# Patient Record
Sex: MALE | Race: BLACK OR AFRICAN AMERICAN | Employment: FULL TIME | URBAN - METROPOLITAN AREA
[De-identification: names, ages, dates, MRNs, and addresses within clinical notes are randomized per-mention and may not be internally consistent; named-entity substitution may affect disease eponyms.]

---

## 2017-10-04 ENCOUNTER — ED HISTORICAL/CONVERTED ENCOUNTER (OUTPATIENT)
Dept: OTHER | Age: 22
End: 2017-10-04

## 2017-10-09 ENCOUNTER — TELEPHONE (OUTPATIENT)
Dept: CARDIOLOGY CLINIC | Age: 22
End: 2017-10-09

## 2017-10-09 NOTE — TELEPHONE ENCOUNTER
Left message on voicemail asking for call back with hospital we can request records from and/or if he can bring any records. I also reminded him to arrive 20 minutes early.     Future Appointments  Date Time Provider Erin Dyer   10/10/2017 10:20 AM Rui Santiago  [address]

## 2017-10-10 ENCOUNTER — OFFICE VISIT (OUTPATIENT)
Dept: CARDIOLOGY CLINIC | Age: 22
End: 2017-10-10

## 2017-10-10 VITALS — DIASTOLIC BLOOD PRESSURE: 94 MMHG | HEART RATE: 75 BPM | WEIGHT: 220 LBS | SYSTOLIC BLOOD PRESSURE: 156 MMHG

## 2017-10-10 DIAGNOSIS — R07.9 CHEST PAIN, UNSPECIFIED TYPE: Primary | ICD-10-CM

## 2017-10-10 DIAGNOSIS — R94.31 ABNORMAL EKG: ICD-10-CM

## 2017-10-10 DIAGNOSIS — I10 ESSENTIAL HYPERTENSION: ICD-10-CM

## 2017-10-10 RX ORDER — INSULIN ASPART 100 [IU]/ML
INJECTION, SOLUTION INTRAVENOUS; SUBCUTANEOUS
COMMUNITY

## 2017-10-10 RX ORDER — INSULIN GLARGINE 100 [IU]/ML
30 INJECTION, SOLUTION SUBCUTANEOUS
COMMUNITY

## 2017-10-10 RX ORDER — LISINOPRIL 10 MG/1
10 TABLET ORAL DAILY
Qty: 30 TAB | Refills: 30 | Status: SHIPPED | OUTPATIENT
Start: 2017-10-10 | End: 2017-10-31 | Stop reason: SDUPTHER

## 2017-10-10 NOTE — PROGRESS NOTES
HISTORY OF PRESENT ILLNESS  Keisha Li is a 23 y.o. male. He is referred for evaluation of chest pain. He has had the pain for about one month and he has gone to three separate emergency rooms. He was given nonsteroidal medications to take, but has only recently been taking them. He characterizes the discomfort as a squeezing discomfort and shortness of breath. It is worse with movement and taking a deep breath. He has type 1 diabetes starting six years ago. One year ago, when he had drainage of an abscess, he had high blood pressure and was given Lisinopril, but never took it. EKGs have shown left ventricular hypertrophy with strain pattern and this is evident on the EKG done in the office today. He does not smoke cigarettes or drink alcohol. He was running one mile three to four times per week where he lives in [place], but he cut back on the advice of physicians about one month ago. His uncle had a heart attack. HPI  Patient Active Problem List   Diagnosis Code    Chest pain R07.9    Essential hypertension I10    Abnormal EKG R94.31     Current Outpatient Prescriptions   Medication Sig Dispense Refill    lisinopril (PRINIVIL, ZESTRIL) 10 mg tablet Take 1 Tab by mouth daily. 30 Tab 30    insulin aspart (NOVOLOG) 100 unit/mL injection by SubCUTAneous route Before breakfast, lunch, and dinner.  insulin glargine (LANTUS) 100 unit/mL injection 30 Units by SubCUTAneous route nightly. Past Medical History:   Diagnosis Date    Diabetes ([facility] [address]     Essential hypertension        Review of Systems   Cardiovascular: Positive for chest pain. All other systems reviewed and are negative. Visit Vitals    BP (!) 156/94 (BP 1 Location: Left arm, BP Patient Position: Sitting)    Pulse 75    Wt 220 lb (99.8 kg)       Physical Exam   Constitutional: He is oriented to person, place, and time. He appears well-nourished. HENT:   Head: Atraumatic.    Eyes: Conjunctivae are normal.   Neck: Neck supple. Cardiovascular: Normal rate, regular rhythm and normal heart sounds. Exam reveals no gallop and no friction rub. No murmur heard. Pulmonary/Chest: Breath sounds normal. He has no wheezes. He exhibits tenderness. Abdominal: Bowel sounds are normal.   Musculoskeletal: He exhibits no edema. Neurological: He is oriented to person, place, and time. Skin: Skin is dry. Nursing note and vitals reviewed. ASSESSMENT and PLAN  He has definite chest wall tenderness over his left anterior chest, which reproduces his symptoms. His EKG, however, shows left ventricular hypertrophy with strain and he does have hypertension in the office today. Given his diabetes, I will start him on Lisinopril 10 mg per day. I will see him back in about three weeks and do an echo here in the office. He may have had one done six weeks ago in [facility] and was told there were no abnormalities. He also had labs done and he was told there were no abnormalities. I am not sure he needs stress testing at this point, but possibly in the future.

## 2017-10-10 NOTE — MR AVS SNAPSHOT
Visit Information Date & Time Provider Department Dept. Phone Encounter #  
 10/10/2017 10:20 AM Sj Addison MD [facility] Anayeli Ozuna [phone] [id number] Allergies as of 10/10/2017  Review Complete On: 10/10/2017 By: Beth Reading No Known Allergies Current Immunizations  Never Reviewed No immunizations on file. Not reviewed this visit You Were Diagnosed With   
  
 Codes Comments Chest pain, unspecified type    -  Primary ICD-10-CM: R07.9 ICD-9-CM: 786.50 Vitals BP Pulse Weight(growth percentile) Smoking Status (!) 156/94 (BP 1 Location: Left arm, BP Patient Position: Sitting) 75 220 lb (99.8 kg) Never Smoker Vitals History Your Updated Medication List  
  
   
This list is accurate as of: 10/10/17 10:51 AM.  Always use your most recent med list.  
  
  
  
  
 LANTUS 100 unit/mL injection Generic drug:  insulin glargine 30 Units by SubCUTAneous route nightly. NovoLOG 100 unit/mL injection Generic drug:  insulin aspart  
by SubCUTAneous route Before breakfast, lunch, and dinner. We Performed the Following AMB POC EKG ROUTINE W/ 12 LEADS, INTER & REP [62473 CPT(R)] Introducing [facility]! Kelsey Wright introduces [employer] patient portal. Now you can access parts of your medical record, email your doctor's office, and request medication refills online. 1. In your internet browser, go to [email or web address] 2. Click on the First Time User? Click Here link in the Sign In box. You will see the New Member Sign Up page. 3. Enter your [employer] Access Code exactly as it appears below. You will not need to use this code after youve completed the sign-up process. If you do not sign up before the expiration date, you must request a new code. · [employer] Access Code: [email or web address] Expires: 1/8/2018 10:51 AM 
 
 4. Enter the last four digits of your Social Security Number (xxxx) and Date of Birth (mm/dd/yyyy) as indicated and click Submit. You will be taken to the next sign-up page. 5. Create a [employer] ID. This will be your [employer] login ID and cannot be changed, so think of one that is secure and easy to remember. 6. Create a [employer] password. You can change your password at any time. 7. Enter your Password Reset Question and Answer. This can be used at a later time if you forget your password. 8. Enter your e-mail address. You will receive e-mail notification when new information is available in [address] 9. Click Sign Up. You can now view and download portions of your medical record. 10. Click the Download Summary menu link to download a portable copy of your medical information. If you have questions, please visit the Frequently Asked Questions section of the [employer] website. Remember, [employer] is NOT to be used for urgent needs. For medical emergencies, dial 911. Now available from your iPhone and Android! Please provide this summary of care documentation to your next provider. If you have any questions after today's visit, please call [phone].

## 2017-10-31 ENCOUNTER — OFFICE VISIT (OUTPATIENT)
Dept: CARDIOLOGY CLINIC | Age: 22
End: 2017-10-31

## 2017-10-31 ENCOUNTER — CLINICAL SUPPORT (OUTPATIENT)
Dept: CARDIOLOGY CLINIC | Age: 22
End: 2017-10-31

## 2017-10-31 VITALS — SYSTOLIC BLOOD PRESSURE: 144 MMHG | DIASTOLIC BLOOD PRESSURE: 80 MMHG | HEART RATE: 70 BPM | WEIGHT: 218.2 LBS

## 2017-10-31 DIAGNOSIS — E10.9 TYPE 1 DIABETES MELLITUS WITHOUT COMPLICATION (HCC): ICD-10-CM

## 2017-10-31 DIAGNOSIS — R94.31 ABNORMAL EKG: Primary | ICD-10-CM

## 2017-10-31 DIAGNOSIS — R07.9 CHEST PAIN, UNSPECIFIED TYPE: ICD-10-CM

## 2017-10-31 DIAGNOSIS — I10 ESSENTIAL HYPERTENSION: ICD-10-CM

## 2017-10-31 DIAGNOSIS — R94.31 ABNORMAL EKG: ICD-10-CM

## 2017-10-31 DIAGNOSIS — I51.7 LVH (LEFT VENTRICULAR HYPERTROPHY): Primary | ICD-10-CM

## 2017-10-31 RX ORDER — LISINOPRIL 20 MG/1
20 TABLET ORAL DAILY
Qty: 30 TAB | Refills: 11 | Status: SHIPPED | OUTPATIENT
Start: 2017-10-31

## 2017-10-31 NOTE — PROGRESS NOTES
HISTORY OF PRESENT ILLNESS  Murphy Hoang is a 23 y.o. male. HPI  Patient Active Problem List   Diagnosis Code    Chest pain R07.9    Essential hypertension I10    Abnormal EKG R94.31     Current Outpatient Prescriptions   Medication Sig Dispense Refill    lisinopril (PRINIVIL, ZESTRIL) 20 mg tablet Take 1 Tab by mouth daily. 30 Tab 11    insulin aspart (NOVOLOG) 100 unit/mL injection by SubCUTAneous route Before breakfast, lunch, and dinner.  insulin glargine (LANTUS) 100 unit/mL injection 30 Units by SubCUTAneous route nightly. Past Medical History:   Diagnosis Date    Diabetes ([facility] [address]     Essential hypertension      History reviewed. No pertinent surgical history. He is seen in followup for chest pain. He has juvenile onset diabetes and mild hypertension. His chest pain has been noted to be atypical, but his EKG showed left ventricular hypertrophy with strain. An echo done in the office today showed normal left ventricular function but mild hypertrophy. His chest pain has been better since starting on Lisinopril 10 mg, but he still has occasional pain and had a bad episode yesterday. His mother says that there is knotting up of his chest wall when he has the pain - suggesting some spasm of muscles. His pain lasted sixty to ninety minutes yesterday yet his wall motion is normal on echo today. Review of Systems   Cardiovascular: Positive for chest pain. All other systems reviewed and are negative. Visit Vitals    /80 (BP 1 Location: Left arm, BP Patient Position: Sitting)    Pulse 70    Wt 218 lb 3.2 oz (99 kg)       Physical Exam   Constitutional: He is oriented to person, place, and time. He appears well-nourished. HENT:   Head: Atraumatic. Eyes: Conjunctivae are normal.   Neck: Neck supple. Cardiovascular: Normal rate, regular rhythm and normal heart sounds. Exam reveals no gallop and no friction rub. No murmur heard.   Pulmonary/Chest: Breath sounds normal. He has no wheezes. Abdominal: Bowel sounds are normal.   Musculoskeletal: He exhibits no edema. Neurological: He is oriented to person, place, and time. Skin: Skin is dry. Nursing note and vitals reviewed. ASSESSMENT and PLAN  His blood pressure is better, but is still slightly elevated. 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 However, I will increase his Lisinopril from 10 mg to 20 mg per day and see him back on an as-needed basis. His chest pain is clearly noncardiac. I have suggested that he return to exercise. He will call if his blood pressure seems too high or too low.

## 2017-10-31 NOTE — MR AVS SNAPSHOT
Visit Information Date & Time Provider Department Dept. Phone Encounter #  
 10/31/2017 10:40 AM Elizabeth Lanier MD [facility] Sarai Aguilar [phone] [id number] Upcoming Health Maintenance Date Due DTaP/Tdap/Td series (1 - Tdap) 2/25/2014 INFLUENZA AGE 9 TO ADULT 8/1/2017 Allergies as of 10/31/2017  Review Complete On: 10/31/2017 By: Gloria Mcare No Known Allergies Current Immunizations  Never Reviewed No immunizations on file. Not reviewed this visit Vitals BP Pulse Weight(growth percentile) Smoking Status 144/80 (BP 1 Location: Left arm, BP Patient Position: Sitting) 70 218 lb 3.2 oz (99 kg) Never Smoker Vitals History Preferred Pharmacy Pharmacy Name Phone [facility] [address] [facility] Your Updated Medication List  
  
   
This list is accurate as of: 10/31/17 10:57 AM.  Always use your most recent med list.  
  
  
  
  
 LANTUS 100 unit/mL injection Generic drug:  insulin glargine 30 Units by SubCUTAneous route nightly. lisinopril 10 mg tablet Commonly known as:  Mollie Ripa Take 1 Tab by mouth daily. NovoLOG 100 unit/mL injection Generic drug:  insulin aspart  
by SubCUTAneous route Before breakfast, lunch, and dinner. Introducing [facility]! Anahi Davalos introduces [employer] patient portal. Now you can access parts of your medical record, email your doctor's office, and request medication refills online. 1. In your internet browser, go to [email or web address] 2. Click on the First Time User? Click Here link in the Sign In box. You will see the New Member Sign Up page. 3. Enter your [employer] Access Code exactly as it appears below. You will not need to use this code after youve completed the sign-up process. If you do not sign up before the expiration date, you must request a new code. · [employer] Access Code: [email or web address] Expires: 1/8/2018 10:51 AM 
 
4. Enter the last four digits of your Social Security Number (xxxx) and Date of Birth (mm/dd/yyyy) as indicated and click Submit. You will be taken to the next sign-up page. 5. Create a [employer] ID. This will be your [employer] login ID and cannot be changed, so think of one that is secure and easy to remember. 6. Create a [employer] password. You can change your password at any time. 7. Enter your Password Reset Question and Answer. This can be used at a later time if you forget your password. 8. Enter your e-mail address. You will receive e-mail notification when new information is available in [address] 9. Click Sign Up. You can now view and download portions of your medical record. 10. Click the Download Summary menu link to download a portable copy of your medical information. If you have questions, please visit the Frequently Asked Questions section of the [employer] website. Remember, [employer] is NOT to be used for urgent needs. For medical emergencies, dial 911. Now available from your iPhone and Android! Please provide this summary of care documentation to your next provider. If you have any questions after today's visit, please call [phone].

## 2020-10-26 ENCOUNTER — HOSPITAL ENCOUNTER (EMERGENCY)
Age: 25
Discharge: HOME | End: 2020-10-26
Attending: EMERGENCY MEDICINE
Payer: COMMERCIAL

## 2020-10-26 ENCOUNTER — APPOINTMENT (OUTPATIENT)
Dept: CT IMAGING | Age: 25
End: 2020-10-26
Attending: EMERGENCY MEDICINE
Payer: COMMERCIAL

## 2020-10-26 ENCOUNTER — APPOINTMENT (OUTPATIENT)
Dept: GENERAL RADIOLOGY | Age: 25
End: 2020-10-26
Attending: EMERGENCY MEDICINE
Payer: COMMERCIAL

## 2020-10-26 VITALS
SYSTOLIC BLOOD PRESSURE: 116 MMHG | RESPIRATION RATE: 15 BRPM | TEMPERATURE: 98.3 F | HEIGHT: 72 IN | WEIGHT: 230 LBS | HEART RATE: 94 BPM | BODY MASS INDEX: 31.15 KG/M2 | DIASTOLIC BLOOD PRESSURE: 75 MMHG | OXYGEN SATURATION: 99 %

## 2020-10-26 DIAGNOSIS — R07.9 CHEST PAIN, UNSPECIFIED TYPE: Primary | ICD-10-CM

## 2020-10-26 LAB
ALBUMIN SERPL-MCNC: 4.2 G/DL (ref 3.5–5)
ALBUMIN/GLOB SERPL: 1.1 {RATIO} (ref 1.1–2.2)
ALP SERPL-CCNC: 121 U/L (ref 45–117)
ALT SERPL-CCNC: 32 U/L (ref 12–78)
ANION GAP SERPL CALC-SCNC: 6 MMOL/L (ref 5–15)
AST SERPL W P-5'-P-CCNC: 13 U/L (ref 15–37)
ATRIAL RATE: 115 BPM
BASOPHILS # BLD: 0 K/UL (ref 0–0.1)
BASOPHILS NFR BLD: 0 % (ref 0–1)
BILIRUB SERPL-MCNC: 0.2 MG/DL (ref 0.2–1)
BNP SERPL-MCNC: 6 PG/ML
BUN SERPL-MCNC: 11 MG/DL (ref 6–20)
BUN/CREAT SERPL: 12 (ref 12–20)
CA-I BLD-MCNC: 9.3 MG/DL (ref 8.5–10.1)
CALCULATED P AXIS, ECG09: 54 DEGREES
CALCULATED R AXIS, ECG10: 53 DEGREES
CALCULATED T AXIS, ECG11: -71 DEGREES
CHLORIDE SERPL-SCNC: 101 MMOL/L (ref 97–108)
CO2 SERPL-SCNC: 27 MMOL/L (ref 21–32)
CREAT SERPL-MCNC: 0.95 MG/DL (ref 0.7–1.3)
DIAGNOSIS, 93000: NORMAL
DIFFERENTIAL METHOD BLD: ABNORMAL
EOSINOPHIL # BLD: 0 K/UL (ref 0–0.4)
EOSINOPHIL NFR BLD: 0 % (ref 0–7)
ERYTHROCYTE [DISTWIDTH] IN BLOOD BY AUTOMATED COUNT: 12.9 % (ref 11.5–14.5)
GLOBULIN SER CALC-MCNC: 4 G/DL (ref 2–4)
GLUCOSE SERPL-MCNC: 385 MG/DL (ref 65–100)
HCT VFR BLD AUTO: 42.1 % (ref 36.6–50.3)
HGB BLD-MCNC: 14.3 G/DL (ref 12.1–17)
IMM GRANULOCYTES # BLD AUTO: 0 K/UL (ref 0–0.04)
IMM GRANULOCYTES NFR BLD AUTO: 0 % (ref 0–0.5)
LYMPHOCYTES # BLD: 0.8 K/UL (ref 0.8–3.5)
LYMPHOCYTES NFR BLD: 9 % (ref 12–49)
MCH RBC QN AUTO: 28.3 PG (ref 26–34)
MCHC RBC AUTO-ENTMCNC: 34 G/DL (ref 30–36.5)
MCV RBC AUTO: 83.4 FL (ref 80–99)
MONOCYTES # BLD: 0.3 K/UL (ref 0–1)
MONOCYTES NFR BLD: 4 % (ref 5–13)
NEUTS SEG # BLD: 7.3 K/UL (ref 1.8–8)
NEUTS SEG NFR BLD: 87 % (ref 32–75)
P-R INTERVAL, ECG05: 132 MS
PLATELET # BLD AUTO: 257 K/UL (ref 150–400)
PMV BLD AUTO: 10.3 FL (ref 8.9–12.9)
POTASSIUM SERPL-SCNC: 4.4 MMOL/L (ref 3.5–5.1)
PROT SERPL-MCNC: 8.2 G/DL (ref 6.4–8.2)
Q-T INTERVAL, ECG07: 314 MS
QRS DURATION, ECG06: 78 MS
QTC CALCULATION (BEZET), ECG08: 434 MS
RBC # BLD AUTO: 5.05 M/UL (ref 4.1–5.7)
SODIUM SERPL-SCNC: 134 MMOL/L (ref 136–145)
TROPONIN I SERPL-MCNC: <0.05 NG/ML
TROPONIN I SERPL-MCNC: <0.05 NG/ML
VENTRICULAR RATE, ECG03: 115 BPM
WBC # BLD AUTO: 8.4 K/UL (ref 4.1–11.1)

## 2020-10-26 PROCEDURE — 71275 CT ANGIOGRAPHY CHEST: CPT

## 2020-10-26 PROCEDURE — 83880 ASSAY OF NATRIURETIC PEPTIDE: CPT

## 2020-10-26 PROCEDURE — 74011250637 HC RX REV CODE- 250/637: Performed by: EMERGENCY MEDICINE

## 2020-10-26 PROCEDURE — 80053 COMPREHEN METABOLIC PANEL: CPT

## 2020-10-26 PROCEDURE — 36415 COLL VENOUS BLD VENIPUNCTURE: CPT

## 2020-10-26 PROCEDURE — 74011000636 HC RX REV CODE- 636: Performed by: EMERGENCY MEDICINE

## 2020-10-26 PROCEDURE — 93005 ELECTROCARDIOGRAM TRACING: CPT

## 2020-10-26 PROCEDURE — 85025 COMPLETE CBC W/AUTO DIFF WBC: CPT

## 2020-10-26 PROCEDURE — 84484 ASSAY OF TROPONIN QUANT: CPT

## 2020-10-26 PROCEDURE — 74011250636 HC RX REV CODE- 250/636: Performed by: EMERGENCY MEDICINE

## 2020-10-26 PROCEDURE — 99284 EMERGENCY DEPT VISIT MOD MDM: CPT

## 2020-10-26 PROCEDURE — 96374 THER/PROPH/DIAG INJ IV PUSH: CPT

## 2020-10-26 PROCEDURE — 71045 X-RAY EXAM CHEST 1 VIEW: CPT

## 2020-10-26 RX ORDER — KETOROLAC TROMETHAMINE 10 MG/1
10 TABLET, FILM COATED ORAL
Qty: 20 TAB | Refills: 0 | Status: SHIPPED | OUTPATIENT
Start: 2020-10-26 | End: 2020-10-31

## 2020-10-26 RX ORDER — LORAZEPAM 1 MG/1
1 TABLET ORAL
Status: COMPLETED | OUTPATIENT
Start: 2020-10-26 | End: 2020-10-26

## 2020-10-26 RX ORDER — KETOROLAC TROMETHAMINE 15 MG/ML
15 INJECTION, SOLUTION INTRAMUSCULAR; INTRAVENOUS ONCE
Status: COMPLETED | OUTPATIENT
Start: 2020-10-26 | End: 2020-10-26

## 2020-10-26 RX ORDER — HYDROXYZINE PAMOATE 25 MG/1
25 CAPSULE ORAL
Qty: 20 CAP | Refills: 0 | Status: SHIPPED | OUTPATIENT
Start: 2020-10-26 | End: 2020-11-02

## 2020-10-26 RX ADMIN — LORAZEPAM 1 MG: 1 TABLET ORAL at 16:54

## 2020-10-26 RX ADMIN — IOPAMIDOL 100 ML: 755 INJECTION, SOLUTION INTRAVENOUS at 10:15

## 2020-10-26 RX ADMIN — KETOROLAC TROMETHAMINE 15 MG: 15 INJECTION, SOLUTION INTRAMUSCULAR; INTRAVENOUS at 19:17

## 2020-10-26 NOTE — ED PROVIDER NOTES
EMERGENCY DEPARTMENT HISTORY AND PHYSICAL EXAM        Date: 10/26/2020  Patient Name: Stef Lyle. History of Presenting Illness     Chief Complaint   Patient presents with    Chest Pain    Shortness of Breath       History Provided By: Patient    HPI: Stef Lyle., 30 y.o. male with past medical history of diabetes and hypertension who presents with chest pain. Symptoms started while he was typing at work around noon time. Pain is in the center of his chest and the left side. Pain is 9/10, pressure, and nonradiating. He has no aggravating or relieving factors. He did not take anything for this. PCP: None    Current Outpatient Medications   Medication Sig Dispense Refill    ketorolac (TORADOL) 10 mg tablet Take 1 Tab by mouth every six (6) hours as needed for Pain for up to 5 days. 20 Tab 0    hydrOXYzine pamoate (VISTARIL) 25 mg capsule Take 1 Cap by mouth three (3) times daily as needed for Anxiety for up to 7 days. 20 Cap 0    lisinopril (PRINIVIL, ZESTRIL) 20 mg tablet Take 1 Tab by mouth daily. 30 Tab 11    insulin aspart (NOVOLOG) 100 unit/mL injection by SubCUTAneous route Before breakfast, lunch, and dinner.  insulin glargine (LANTUS) 100 unit/mL injection 30 Units by SubCUTAneous route nightly. Past History     Past Medical History:  Past Medical History:   Diagnosis Date    Diabetes ([address]     Essential hypertension        Past Surgical History:  No past surgical history on file. Family History:  No family history on file. Social History:  Social History     Tobacco Use    Smoking status: Never Smoker    Smokeless tobacco: Never Used   Substance Use Topics    Alcohol use: Not on file    Drug use: Not on file       Allergies:  No Known Allergies    Review of Systems   Review of Systems   Constitutional: Negative for fever. HENT: Negative for congestion. Eyes: Negative for visual disturbance. Respiratory: Negative for shortness of breath. Cardiovascular: Positive for chest pain. Gastrointestinal: Negative for abdominal pain. Genitourinary: Negative for dysuria. Musculoskeletal: Negative for arthralgias. Skin: Negative for rash. Neurological: Negative for headaches. Physical Exam   General: No acute distress, uncomfortable and tearful. Well-nourished. Skin: No rash. Head: Normocephalic. Atraumatic. Eye: No proptosis or conjunctival injections. Respiratory: No apparent respiratory distress. Gastrointestinal: Nondistended. Musculoskeletal: No obvious bony deformities. Psychiatric: Cooperative. Appropriate mood and affect. Diagnostic Study Results     Labs -     Recent Results (from the past 24 hour(s))   EKG, 12 LEAD, INITIAL    Collection Time: 10/26/20  2:14 PM   Result Value Ref Range    Ventricular Rate 115 BPM    Atrial Rate 115 BPM    P-R Interval 132 ms    QRS Duration 78 ms    Q-T Interval 314 ms    QTC Calculation (Bezet) 434 ms    Calculated P Axis 54 degrees    Calculated R Axis 53 degrees    Calculated T Axis -71 degrees    Diagnosis       Sinus tachycardia  T wave abnormality, consider inferior ischemia  T wave abnormality, consider anterolateral ischemia  Abnormal ECG  No previous ECGs available  Confirmed by Lidia Grajeda ([id number]) on 10/26/2020 3:02:32 PM     CBC WITH AUTOMATED DIFF    Collection Time: 10/26/20  2:15 PM   Result Value Ref Range    WBC 8.4 4.1 - 11.1 K/uL    RBC 5.05 4. 10 - 5.70 M/uL    HGB 14.3 12.1 - 17.0 g/dL    HCT 42.1 36.6 - 50.3 %    MCV 83.4 80.0 - 99.0 FL    MCH 28.3 26.0 - 34.0 PG    MCHC 34.0 30.0 - 36.5 g/dL    RDW 12.9 11.5 - 14.5 %    PLATELET 352 473 - 070 K/uL    MPV 10.3 8.9 - 12.9 FL    NEUTROPHILS 87 (H) 32 - 75 %    LYMPHOCYTES 9 (L) 12 - 49 %    MONOCYTES 4 (L) 5 - 13 %    EOSINOPHILS 0 0 - 7 %    BASOPHILS 0 0 - 1 %    IMMATURE GRANULOCYTES 0 0.0 - 0.5 %    ABS. NEUTROPHILS 7.3 1.8 - 8.0 K/UL    ABS. LYMPHOCYTES 0.8 0.8 - 3.5 K/UL    ABS. MONOCYTES 0.3 0.0 - 1.0 K/UL    ABS. EOSINOPHILS 0.0 0.0 - 0.4 K/UL    ABS. BASOPHILS 0.0 0.0 - 0.1 K/UL    ABS. IMM. GRANS. 0.0 0.00 - 0.04 K/UL    DF AUTOMATED     METABOLIC PANEL, COMPREHENSIVE    Collection Time: 10/26/20  2:15 PM   Result Value Ref Range    Sodium 134 (L) 136 - 145 mmol/L    Potassium 4.4 3.5 - 5.1 mmol/L    Chloride 101 97 - 108 mmol/L    CO2 27 21 - 32 mmol/L    Anion gap 6 5 - 15 mmol/L    Glucose 385 (H) 65 - 100 mg/dL    BUN 11 6 - 20 mg/dL    Creatinine 0.95 0.70 - 1.30 mg/dL    BUN/Creatinine ratio 12 12 - 20      GFR est AA >60 >60 ml/min/1.73m2    GFR est non-AA >60 >60 ml/min/1.73m2    Calcium 9.3 8.5 - 10.1 mg/dL    Bilirubin, total 0.2 0.2 - 1.0 mg/dL    AST (SGOT) 13 (L) 15 - 37 U/L    ALT (SGPT) 32 12 - 78 U/L    Alk. phosphatase 121 (H) 45 - 117 U/L    Protein, total 8.2 6.4 - 8.2 g/dL    Albumin 4.2 3.5 - 5.0 g/dL    Globulin 4.0 2.0 - 4.0 g/dL    A-G Ratio 1.1 1.1 - 2.2     TROPONIN I    Collection Time: 10/26/20  2:15 PM   Result Value Ref Range    Troponin-I, Qt. <0.05 <0.05 ng/mL   BNP    Collection Time: 10/26/20  2:15 PM   Result Value Ref Range    NT pro-BNP 6 <125 pg/mL   TROPONIN I    Collection Time: 10/26/20  4:45 PM   Result Value Ref Range    Troponin-I, Qt. <0.05 <0.05 ng/mL       Radiologic Studies -   CTA CHEST W OR W WO CONT   Final Result   Impression: No evidence of pulmonary embolus or other acute cardiopulmonary   disease. Please see full report. XR CHEST SNGL V   Final Result   Impression:    Low lung volumes, which limit sensitivity of this exam. No obvious acute   cardiopulmonary process is evident, however repeat PA and lateral chest   radiograph would be helpful for further evaluation if clinically indicated. CT Results  (Last 48 hours)               10/26/20 1621  CTA CHEST W OR W WO CONT Final result    Impression:  Impression: No evidence of pulmonary embolus or other acute cardiopulmonary   disease. Please see full report.                        Narrative: History is severe chest pain. TECHNIQUE: Serial axial images were obtained from the thoracic inlet through the   lung bases at 2.5 mm intervals during 100 cc IV contrast administration. Axial   and coronal maximal intensity projection/MIP and sagittal and coronal   reformatted images are reviewed. Lung, soft tissue and bone windows are   evaluated. Dose reduction: All CT scans at this facility are performed using dose reduction   optimization techniques as appropriate to a performed exam including the   following: Automated exposure control, adjustments of the mA and/or kV according   to patient size, or use of iterative reconstruction technique. Findings: The enhancement of the pulmonary arteries is exuberant. There is no   evidence of pulmonary embolus in the visualized pulmonary arteries. There is no   pneumonia, pneumothorax or effusion. No parenchymal mass is identified. The heart is not enlarged. There is no pericardial effusion. There is no   adenopathy. The aorta is not dilated and there is no dissection. The osseous   structures are within normal limits. CXR Results  (Last 48 hours)               10/26/20 1509  XR CHEST SNGL V Final result    Impression:  Impression:    Low lung volumes, which limit sensitivity of this exam. No obvious acute   cardiopulmonary process is evident, however repeat PA and lateral chest   radiograph would be helpful for further evaluation if clinically indicated. Narrative:  Clinical history: Chest pain       Comparison: Chest radiograph 10/4/2017. Findings:    Single view chest. Lung volumes are low with bilateral perihilar vascular   crowding present. No discrete focal consolidation, pleural effusion, or   pneumothorax. Cardiac silhouette is magnified due to low lung volumes and AP   technique. No pulmonary edema. The osseous structures are intact.                  Medical Decision Making and ED Course     I reviewed the available vital signs, nursing notes, past medical history, past surgical history, family history, and social history. Vital Signs - Reviewed the patient's vital signs. Patient Vitals for the past 12 hrs:   Temp Pulse Resp BP SpO2   10/26/20 1857 -- 94 15 116/75 99 %   10/26/20 1702 -- 92 22 132/82 98 %   10/26/20 1415 98.3 °F (36.8 °C) (!) 117 20 (!) 170/121 100 %       EKG interpretation: Sinus tachycardia at 115 bpm.  Normal WV interval, QRS duration, and QTc interval.  Diffuse T wave abnormalities. No ST segment abnormalities. Medical Decision Making:   Presented with chest pain. The  differential diagnosis is ACS, atypical chest pain, pulmonary embolism, aortic dissection, anxiety reaction, costochondritis, substance abuse. Work-up is negative. I did complete a CT angiogram of the chest due to patient's significant distress and tachycardia. No PE or aortic dissection is visualized. Given Ativan and Toradol and appears to be significantly improved on my visual exam however does complain of chest pain still. Unclear cause of his pain but could be anxiety related or costochondritis. I recommended follow-up with primary care doctor and did give prescription for Toradol and hydroxyzine. Disposition     Discharged    DISCHARGE PLAN:  1. Current Discharge Medication List      CONTINUE these medications which have NOT CHANGED    Details   lisinopril (PRINIVIL, ZESTRIL) 20 mg tablet Take 1 Tab by mouth daily. Qty: 30 Tab, Refills: 11      insulin aspart (NOVOLOG) 100 unit/mL injection by SubCUTAneous route Before breakfast, lunch, and dinner. insulin glargine (LANTUS) 100 unit/mL injection 30 Units by SubCUTAneous route nightly.            2.   Follow-up Information     Follow up With Specialties Details Why [address] [facility] EMERGENCY DEPT Emergency Medicine Go today As soon as possible if symptoms worsen [address] Mitchell Lozano MD Internal Medicine Schedule an appointment as soon as possible for a visit  As needed, For followup and recheck of todays symptoms [address]      Umu Murry MD Family Medicine Schedule an appointment as soon as possible for a visit in 1 day As needed [address] [address]  [phone]          3. Return to ED if worse     Diagnosis     Clinical impression:   1. Chest pain, unspecified type           Attestation:  Please note that this dictation was completed with [employer], the computer voice recognition software. Quite often unanticipated grammatical, syntax, homophones, and other interpretive errors are inadvertently transcribed by the computer software. Please disregard these errors. Please excuse any errors that have escaped final proofreading. Thank you.   Gorge Castillo, DO

## 2020-10-26 NOTE — ED TRIAGE NOTES
Rapid response from ED entrance, pt complaining of chest pain and sob. Pt lethargic, acting inappropriately in triage. Pt very hypertensive in triage.

## 2022-03-19 PROBLEM — R07.9 CHEST PAIN: Status: ACTIVE | Noted: 2017-10-10

## 2022-03-19 PROBLEM — R94.31 ABNORMAL EKG: Status: ACTIVE | Noted: 2017-10-10

## 2022-03-19 PROBLEM — I10 ESSENTIAL HYPERTENSION: Status: ACTIVE | Noted: 2017-10-10

## 2023-05-22 RX ORDER — INSULIN GLARGINE 100 [IU]/ML
30 INJECTION, SOLUTION SUBCUTANEOUS
COMMUNITY

## 2023-05-22 RX ORDER — INSULIN ASPART 100 [IU]/ML
INJECTION, SOLUTION INTRAVENOUS; SUBCUTANEOUS
COMMUNITY

## 2023-05-22 RX ORDER — LISINOPRIL 20 MG/1
20 TABLET ORAL DAILY
COMMUNITY
Start: 2017-10-31